# Patient Record
Sex: MALE | Race: WHITE | NOT HISPANIC OR LATINO | URBAN - METROPOLITAN AREA
[De-identification: names, ages, dates, MRNs, and addresses within clinical notes are randomized per-mention and may not be internally consistent; named-entity substitution may affect disease eponyms.]

---

## 2023-09-08 ENCOUNTER — APPOINTMENT (OUTPATIENT)
Dept: LAB | Facility: CLINIC | Age: 23
End: 2023-09-08
Payer: COMMERCIAL

## 2023-09-08 DIAGNOSIS — Z13.0 ENCOUNTER FOR SICKLE-CELL SCREENING: ICD-10-CM

## 2023-09-08 PROCEDURE — 85660 RBC SICKLE CELL TEST: CPT

## 2023-09-08 PROCEDURE — 36415 COLL VENOUS BLD VENIPUNCTURE: CPT

## 2023-09-11 LAB — SICKLE CELLS BLD QL SMEAR: NEGATIVE

## 2024-03-11 ENCOUNTER — ATHLETIC TRAINING (OUTPATIENT)
Dept: SPORTS MEDICINE | Facility: OTHER | Age: 24
End: 2024-03-11

## 2024-03-11 DIAGNOSIS — S76.302A HAMSTRING INJURY, LEFT, INITIAL ENCOUNTER: Primary | ICD-10-CM

## 2024-03-11 NOTE — PROGRESS NOTES
Athletic Training Daily Note    Name: Murtaza Cunningham  Age: 23 y.o.     Visit Diagnosis: Hamstring injury, left, initial encounter [G27.304A]    Subjective: Ath reports to [facility] for [facility]hab. Ath reported hamstring P! 1 week ago while the [employer] team was down in [place] playing games. Ath notes no specific MIGUELITO, but appears to be overuse injury due to amount of games ath played in.     No PMHx of HS injuries. No neurological sx.     Objective: Notable HS tightness B. Slight ERP with SLR str of L HS. Ath is slightly PT over distal semimembranosus musculotendinous junction. Ath notes walking and low-level jogging is tolerable, but over striding and sprinting causes sharp P!.    Treatment Log:  Date: 3/11        Playing Status: Limited        L HS soft tissue work 10'        LE Nerve Glides          Exercise/Treatment          DL Bridges with ADD ball squeeze [id number]        SL Bridge [id number] x       SL RDL 2x10 15#                                                                                          Running Progression:  3/11 - FWD/BKWD jog 50% - side shuffles, DL hops. - Minor P! And tightness noted

## 2024-03-12 ENCOUNTER — ATHLETIC TRAINING (OUTPATIENT)
Dept: SPORTS MEDICINE | Facility: OTHER | Age: 24
End: 2024-03-12

## 2024-03-12 DIAGNOSIS — S76.302A HAMSTRING INJURY, LEFT, INITIAL ENCOUNTER: Primary | ICD-10-CM

## 2024-03-12 NOTE — PROGRESS NOTES
"Athletic Training Daily Note    Name: Murtaza Cunningham  Age: 23 y.o.     Visit Diagnosis: Hamstring injury, left, initial encounter [K49.076L]    Subjective: [facility] reports to [facility] for [facility]hab. Ath reports muscle soreness today, but no increase in P! After rehab. Ath reported that he felt \"good\" after rehab today.    Objective: Slight PT over distal medial aspect of HS and partial ADD. Ath is able to walk/jog P! Free. Has 2-3/10 P! With jogging and cutting today.    Treatment Log:  Ath rehab today consisted of:    Static str, clin PNF str - [id number] holds  DL bridge into SL leg lifts with ADD ball squeeze 2x10  End-range HS curls  "

## 2024-03-15 ENCOUNTER — ATHLETIC TRAINING (OUTPATIENT)
Dept: SPORTS MEDICINE | Facility: OTHER | Age: 24
End: 2024-03-15

## 2024-03-15 DIAGNOSIS — S76.309A HAMSTRING INJURY, INITIAL ENCOUNTER: Primary | ICD-10-CM

## 2024-03-15 NOTE — PROGRESS NOTES
"Athletic Training Daily Note    Name: Murtaza Cunningham  Age: 23 y.o.     Visit Diagnosis: Hamstring injury, initial encounter [H22.317J]    Subjective: [facility] reports to [facility] for [facility]. Ath has been progressing well. He reports lessening P! And tightness in distal medial HS. He has been batting in DH position with no issues. Ath is beginning to ease into fielding drills and reports no P!.      Objective: Ath completed rehab and return to running today with no P! Reaching above 4/10. He notes all exercises as \"manageable\".     Treatment Log:  Soft tissue massage - 5'  Nerve glides LE -20r  SL Ecc Bridge w/ towel - 2x10  Mr Raymond's HS lunge - 2x10  Nordic HS curls - 1x10    Running  FWD run - 50%, 75%, 90% 1x  Diagonal cone drill - 4x  SL jump with skater hop - 2x  4-cone reaction drill - 4x  "

## 2024-03-20 ENCOUNTER — ATHLETIC TRAINING (OUTPATIENT)
Dept: SPORTS MEDICINE | Facility: OTHER | Age: 24
End: 2024-03-20

## 2024-03-20 DIAGNOSIS — S76.302A LEFT HAMSTRING INJURY, INITIAL ENCOUNTER: Primary | ICD-10-CM

## 2024-03-20 NOTE — PROGRESS NOTES
Athletic Training Daily Note    Name: Murtaza Cunningham  Age: 23 y.o.     Visit Diagnosis: Left hamstring injury, initial encounter [R38.121W]    Subjective: [facility] reports to [facility] for [facility]. Ath is now back to play as tolerated. He competed in the baseball game yesterday w/o issues. Ath will continue completing maintenance rehab for the next 2-3 weeks.     Objective: Ath notes no sharp P! Or PT over injured area. Ath has FROM P! Free.    Treatment Log:  SL Ecc Slide outs - 2x10x5  SL Kneeling HS curls - 3x10  DL Box jumps - 2x10  Skater jumpos with 10# ball - halfcourt up/back 2x

## 2024-04-01 ENCOUNTER — ATHLETIC TRAINING (OUTPATIENT)
Dept: SPORTS MEDICINE | Facility: OTHER | Age: 24
End: 2024-04-01

## 2024-04-01 DIAGNOSIS — S76.302S LEFT HAMSTRING INJURY, SEQUELA: Primary | ICD-10-CM

## 2024-04-01 NOTE — PROGRESS NOTES
Athletic Training Daily Note    Name: Murtaza Cunningham  Age: 23 y.o.     Visit Diagnosis: Left hamstring injury, sequela [S76.184S]    Subjective: [facility] reports to [facility] for [facility]hab. Ath notes increased P! In L distal HS over there weekend during baseball game. Ath continued play and was tolerable.     Objective: LOP now distal to original injury along musculotendinous junction of semimembranosus.     Treatment Log:  Ath completed rehab today:    Soft tissue massage - 5'  PNF str - [id number]  Nerve glides - 20 reps  Glute series 3 - 2x10 B  20# RDLs - 3x10  Coppenhagen Plank - 2x8x5s  Elevated SL bridge - [id number]

## 2024-04-04 ENCOUNTER — ATHLETIC TRAINING (OUTPATIENT)
Dept: SPORTS MEDICINE | Facility: OTHER | Age: 24
End: 2024-04-04

## 2024-04-04 DIAGNOSIS — S76.302S LEFT HAMSTRING INJURY, SEQUELA: Primary | ICD-10-CM

## 2024-04-04 NOTE — PROGRESS NOTES
Athletic Training Daily Note    Name: Murtaza Cunningham  Age: 23 y.o.     Visit Diagnosis: Left hamstring injury, sequela [Y69.063S]    Subjective: [facility] reports to [facility] for [facility]. Ath notes HS P! Is feeling better with past 2 days of px. Ath is continuing to play fully as tolerated and running with slight tightness. Ath notes he has not tested 100% FWD running.     Objective: FROM of K' flex and H' ext. Slight PT noted over semimembranosus tendon.     Treatment Log:  STM - 5'  Nerve Glides 2x10  Mr. Andrade's - 2x10  Nordic HS curls - 2x8    Running Progression  50, 75, 100% sprint  SL Hops into sprint - 4x  Rounded running into directional sprint - 4x

## 2024-04-08 ENCOUNTER — ATHLETIC TRAINING (OUTPATIENT)
Dept: SPORTS MEDICINE | Facility: OTHER | Age: 24
End: 2024-04-08

## 2024-04-08 DIAGNOSIS — S76.302S LEFT HAMSTRING INJURY, SEQUELA: Primary | ICD-10-CM

## 2024-04-08 NOTE — PROGRESS NOTES
Athletic Training Daily Note    Name: Murtaza Cunningham  Age: 23 y.o.     Visit Diagnosis: Left hamstring injury, sequela [C23.783S]    Subjective: [facility] reports to [facility] for [facility]. Ath noted P! In medial HS During the baseball game this past weekend. Ath attempted to continue but [facility] pulled ath from play due to increased P! With movements.     Ath reported today that rest has decreased P!, but still feels pulling with running.    Objective: ROM decreased. MMT 5/5 with P!    Ath completed rehab today and started return to running program with no more than 3/10 P!    Ath will be out until this weekend.     Treatment Log:

## 2024-04-25 ENCOUNTER — ATHLETIC TRAINING (OUTPATIENT)
Dept: SPORTS MEDICINE | Facility: OTHER | Age: 24
End: 2024-04-25

## 2024-04-25 DIAGNOSIS — M54.50 ACUTE RIGHT-SIDED LOW BACK PAIN WITHOUT SCIATICA: Primary | ICD-10-CM

## 2024-04-25 NOTE — PROGRESS NOTES
Athletic Training Daily Note    Name: Murtaza Cunningham  Age: 23 y.o.     Visit Diagnosis: Acute right-sided low back pain without sciatica [M54.50]    Subjective: [facility] reports to [facility] for [facility]hab. Ath reported acute R low back P! After swinging in warmups yesterday. Ath was able to play for a few innings before irritating it further on a check swing. Ath was removed from play.     Objective: No bony tenderness noted. P! Specifically located R paraspinals and QL region. Ath reports 6/10 P! With rotational movements.     Ath reported today a 2/10 P! With rest helping him recover.    Treatment Log:  Ath received IASTM 5'  Cups with movement - Cat/cows, birdogs 2x10  Nerve glides 20 reps  DL ADD ball squeeze with bridge - 2x10  SL Ecc bridge 2x10  Rotation with PVC - 2x10 RTB  - Waist height and low to high.    Ath reports feeling better post tx and rehab and will report tomorrow.

## 2024-04-26 PROCEDURE — 87070 CULTURE OTHR SPECIMN AEROBIC: CPT | Performed by: NURSE PRACTITIONER

## 2024-04-27 ENCOUNTER — LAB REQUISITION (OUTPATIENT)
Dept: LAB | Facility: HOSPITAL | Age: 24
End: 2024-04-27
Payer: COMMERCIAL

## 2024-04-27 DIAGNOSIS — J02.9 ACUTE PHARYNGITIS, UNSPECIFIED: ICD-10-CM

## 2024-04-29 LAB — BACTERIA THROAT CULT: NORMAL
